# Patient Record
Sex: MALE | Race: WHITE | NOT HISPANIC OR LATINO | Employment: OTHER | URBAN - METROPOLITAN AREA
[De-identification: names, ages, dates, MRNs, and addresses within clinical notes are randomized per-mention and may not be internally consistent; named-entity substitution may affect disease eponyms.]

---

## 2022-05-26 ENCOUNTER — OFFICE VISIT (OUTPATIENT)
Dept: NEUROSURGERY | Facility: CLINIC | Age: 67
End: 2022-05-26

## 2022-05-26 VITALS
DIASTOLIC BLOOD PRESSURE: 86 MMHG | TEMPERATURE: 97.3 F | BODY MASS INDEX: 27.74 KG/M2 | SYSTOLIC BLOOD PRESSURE: 158 MMHG | WEIGHT: 183 LBS | HEIGHT: 68 IN

## 2022-05-26 DIAGNOSIS — S32.010A CLOSED COMPRESSION FRACTURE OF BODY OF L1 VERTEBRA: Primary | ICD-10-CM

## 2022-05-26 DIAGNOSIS — S32.020A CLOSED COMPRESSION FRACTURE OF L2 LUMBAR VERTEBRA, INITIAL ENCOUNTER: ICD-10-CM

## 2022-05-26 PROCEDURE — 99204 OFFICE O/P NEW MOD 45 MIN: CPT | Performed by: PHYSICIAN ASSISTANT

## 2022-05-26 RX ORDER — MELOXICAM 15 MG/1
15 TABLET ORAL DAILY
COMMUNITY
Start: 2022-03-21

## 2022-05-26 RX ORDER — ROSUVASTATIN CALCIUM 10 MG/1
10 TABLET, COATED ORAL DAILY
COMMUNITY
Start: 2022-03-30

## 2022-05-26 RX ORDER — LOSARTAN POTASSIUM 50 MG/1
TABLET ORAL
COMMUNITY
Start: 2022-03-28

## 2022-05-26 RX ORDER — TIZANIDINE 4 MG/1
TABLET ORAL
COMMUNITY
Start: 2022-05-03

## 2022-05-26 RX ORDER — OMEGA-3-ACID ETHYL ESTERS 1 G/1
CAPSULE, LIQUID FILLED ORAL
COMMUNITY
Start: 2022-03-28

## 2022-05-26 NOTE — PROGRESS NOTES
Subjective     Chief Complaint: Back pain    Patient ID: Jhonny Scott is a 66 y.o. male seen for consultation today at the request of  SILVERIO Scherer    History of Present Illness    This is a 66-year-old gentleman who presents today with a 3-month history of low back pain.  Symptoms began after jumping off of a dock into a boat 3 months ago.  He was then using a lawnmower a month later and felt worsening back pain.  Since that time he has had severe low midline back pain with associated paraspinous pain.  Pain is a 7/10.  He has been taking Tylenol and ibuprofen which minimally helps.  He denies any radiating pain into his lower extremities.  Denies numbness/tingling, weakness, saddle anesthesia or bowel or bladder dysfunction.    The following portions of the patient's history were reviewed and updated as appropriate: allergies, current medications, past family history, past medical history, past social history, past surgical history and problem list.    Family history:   Family History   Problem Relation Age of Onset   • Cancer Mother    • Dementia Mother    • Heart disease Father        Social history:   Social History     Socioeconomic History   • Marital status:    Tobacco Use   • Smoking status: Never Smoker   • Smokeless tobacco: Former User     Types: Chew     Quit date: 2010   Vaping Use   • Vaping Use: Never used   Substance and Sexual Activity   • Alcohol use: Yes     Comment: 2 drimks per day   • Drug use: Never   • Sexual activity: Defer       Review of Systems   Constitutional: Positive for activity change and fatigue. Negative for appetite change, chills, diaphoresis, fever and unexpected weight change.   HENT: Negative for congestion, dental problem, drooling, ear discharge, ear pain, facial swelling, hearing loss, mouth sores, nosebleeds, postnasal drip, rhinorrhea, sinus pressure, sinus pain, sneezing, sore throat, tinnitus, trouble swallowing and voice change.    Eyes: Negative for  "photophobia, pain, discharge, redness, itching and visual disturbance.   Respiratory: Negative for apnea, cough, choking, chest tightness, shortness of breath, wheezing and stridor.    Cardiovascular: Negative for chest pain, palpitations and leg swelling.   Gastrointestinal: Negative for abdominal distention, abdominal pain, anal bleeding, blood in stool, constipation, diarrhea, nausea, rectal pain and vomiting.   Endocrine: Negative for cold intolerance, heat intolerance, polydipsia, polyphagia and polyuria.   Genitourinary: Negative for decreased urine volume, difficulty urinating, dysuria, enuresis, flank pain, frequency, genital sores, hematuria, penile discharge, penile pain, penile swelling, scrotal swelling, testicular pain and urgency.   Musculoskeletal: Positive for back pain and gait problem. Negative for arthralgias, joint swelling, myalgias, neck pain and neck stiffness.   Skin: Negative for color change, pallor, rash and wound.   Allergic/Immunologic: Negative for environmental allergies, food allergies and immunocompromised state.   Neurological: Positive for weakness and numbness. Negative for dizziness, tremors, seizures, syncope, facial asymmetry, speech difficulty, light-headedness and headaches.   Hematological: Negative for adenopathy. Does not bruise/bleed easily.   Psychiatric/Behavioral: Negative for agitation, behavioral problems, confusion, decreased concentration, dysphoric mood, hallucinations, self-injury, sleep disturbance and suicidal ideas. The patient is not nervous/anxious and is not hyperactive.        Objective   Blood pressure 158/86, temperature 97.3 °F (36.3 °C), height 172.7 cm (68\"), weight 83 kg (183 lb).  Body mass index is 27.83 kg/m².  BMI is >= 25 and < 30. (Overweight) The following options were offered after discussion: weight loss educational material (shared in after visit summary)      Physical Exam  Constitutional:       Appearance: Normal appearance.   HENT:      " Head: Normocephalic and atraumatic.   Eyes:      Conjunctiva/sclera: Conjunctivae normal.   Pulmonary:      Effort: Pulmonary effort is normal.   Musculoskeletal:      Cervical back: Normal range of motion and neck supple.      Lumbar back: Tenderness and bony tenderness present. Negative right straight leg raise test and negative left straight leg raise test.   Skin:     General: Skin is warm and dry.   Neurological:      Mental Status: He is alert and oriented to person, place, and time.      GCS: GCS eye subscore is 4. GCS verbal subscore is 5. GCS motor subscore is 6.      Sensory: Sensation is intact.      Motor: Motor function is intact.      Coordination: Romberg sign negative.      Gait: Gait is intact.      Deep Tendon Reflexes: Reflexes normal.      Comments: Casual gait is normal.  He has full strength direct testing which is symmetric in both upper and lower extremities.  Sensation is intact to light touch testing and symmetric throughout.  Reflexes are 2+ and symmetric.  Clonus is absent.  Billie sign is absent.   Psychiatric:         Mood and Affect: Mood normal.         Behavior: Behavior normal.           Assessment & Plan       This is a 66-year-old gentleman who presents today with severe low back pain. MRI of the lumbar spine has been reviewed with evidence of acute to subacute compression fractures of L1 and L2.  The patient has no signs or symptoms of lumbosacral radiculopathy or cauda equina syndrome.  We will attempt to treat his symptoms conservatively with bracing and pain medication.  He will follow-up with standing x-rays in the next couple of weeks.  Signs/symptoms of warning urgent follow-up were carefully reviewed with him and he was instructed to call with new or worsening symptoms.    I have contacted [facility] to have the patient fit for a custom flexible TLSO.  The brace is to be worn when up and during activities.  Brace will provide pain control, comfort and spine  stabilization.    Diagnoses and all orders for this visit:    1. Closed compression fracture of body of L1 vertebra (HCC) (Primary)  -     Back Brace  -     XR Spine Lumbar Flex & Ext; Future    2. Closed compression fracture of L2 lumbar vertebra, initial encounter (HCC)  -     Back Brace  -     XR Spine Lumbar Flex & Ext; Future        Return in about 2 weeks (around 6/9/2022), or if symptoms worsen or fail to improve.             Tessa Troncoso PA-C

## 2022-06-07 ENCOUNTER — OFFICE VISIT (OUTPATIENT)
Dept: NEUROSURGERY | Facility: CLINIC | Age: 67
End: 2022-06-07

## 2022-06-07 VITALS — TEMPERATURE: 97.1 F | WEIGHT: 183 LBS | HEIGHT: 68 IN | BODY MASS INDEX: 27.74 KG/M2

## 2022-06-07 DIAGNOSIS — M80.08XA AGE-RELATED OSTEOPOROSIS WITH CURRENT PATHOLOGICAL FRACTURE, VERTEBRA(E), INITIAL ENCOUNTER FOR FRACTURE: ICD-10-CM

## 2022-06-07 DIAGNOSIS — S32.020A CLOSED COMPRESSION FRACTURE OF L2 LUMBAR VERTEBRA, INITIAL ENCOUNTER: ICD-10-CM

## 2022-06-07 DIAGNOSIS — S32.010A CLOSED COMPRESSION FRACTURE OF BODY OF L1 VERTEBRA: Primary | ICD-10-CM

## 2022-06-07 PROCEDURE — 99214 OFFICE O/P EST MOD 30 MIN: CPT | Performed by: STUDENT IN AN ORGANIZED HEALTH CARE EDUCATION/TRAINING PROGRAM

## 2022-06-07 NOTE — PROGRESS NOTES
Patient: Jhonny Scott  : 1955    Primary Care Provider: REYNALDO Acosta PA    Requesting Provider: As above      Chief Complaint: Follow-up (XR L1 & L2 compression fracture)      History of Present Illness: This is a 66 y.o. male who presents with 3 months of significant lower back pain.  The patient started having back pain when he jumped onto a boat a few months ago and then went to a masseuse who was manipulating his back and causes significant increase in the pain.  It is sharp and localized to his lower back.  He denies any pain radiating down his legs or weakness in the legs.  He underwent imaging which revealed acute compression fractures of L1 and L2.  He was initially seen by PA who placed him in a TLSO brace and he comes in today for follow-up.  The patient has found significant relief with the brace, but still has some pain when attempting to ambulate.    PMHX  Allergies:  No Known Allergies  Medications    Current Outpatient Medications:   •  losartan (COZAAR) 50 MG tablet, , Disp: , Rfl:   •  meloxicam (MOBIC) 15 MG tablet, Take 15 mg by mouth Daily., Disp: , Rfl:   •  omega-3 acid ethyl esters (LOVAZA) 1 g capsule, , Disp: , Rfl:   •  rosuvastatin (CRESTOR) 10 MG tablet, Take 10 mg by mouth Daily., Disp: , Rfl:   •  tiZANidine (ZANAFLEX) 4 MG tablet, TAKE ONE-HALF TABLET NIGHTLY FOR FOUR NIGHTS THEN INCREASE TO ONE TABLET ONCE NIGHTLY, Disp: , Rfl:   Past Medical History:  Past Medical History:   Diagnosis Date   • Arthritis    • Hypertension      Past Surgical History:  Past Surgical History:   Procedure Laterality Date   • LUMBAR DISCECTOMY      L4-5 - Dr. Josue Smith     Social Hx:  Social History     Tobacco Use   • Smoking status: Never Smoker   • Smokeless tobacco: Former User     Types: Chew     Quit date:    Vaping Use   • Vaping Use: Never used   Substance Use Topics   • Alcohol use: Yes     Comment: 2 drimks per day   • Drug use: Never     Family Hx:  Family History   Problem  Relation Age of Onset   • Cancer Mother    • Dementia Mother    • Heart disease Father      Review of Systems:        Review of Systems   Constitutional: Positive for activity change and fatigue. Negative for appetite change, chills, diaphoresis, fever and unexpected weight change.   HENT: Negative for congestion, dental problem, drooling, ear discharge, ear pain, facial swelling, hearing loss, mouth sores, nosebleeds, postnasal drip, rhinorrhea, sinus pressure, sinus pain, sneezing, sore throat, tinnitus, trouble swallowing and voice change.    Eyes: Negative for photophobia, pain, discharge, redness, itching and visual disturbance.   Respiratory: Negative for apnea, cough, choking, chest tightness, shortness of breath, wheezing and stridor.    Cardiovascular: Negative for chest pain, palpitations and leg swelling.   Gastrointestinal: Negative for abdominal distention, abdominal pain, anal bleeding, blood in stool, constipation, diarrhea, nausea, rectal pain and vomiting.   Endocrine: Negative for cold intolerance, heat intolerance, polydipsia, polyphagia and polyuria.   Genitourinary: Negative for decreased urine volume, difficulty urinating, dysuria, enuresis, flank pain, frequency, genital sores, hematuria, penile discharge, penile pain, penile swelling, scrotal swelling, testicular pain and urgency.   Musculoskeletal: Positive for back pain and gait problem. Negative for arthralgias, joint swelling, myalgias, neck pain and neck stiffness.   Skin: Negative for color change, pallor, rash and wound.   Allergic/Immunologic: Negative for environmental allergies, food allergies and immunocompromised state.   Neurological: Positive for weakness and numbness. Negative for dizziness, tremors, seizures, syncope, facial asymmetry, speech difficulty, light-headedness and headaches.   Hematological: Negative for adenopathy. Does not bruise/bleed easily.   Psychiatric/Behavioral: Negative for agitation, behavioral problems,  "confusion, decreased concentration, dysphoric mood, hallucinations, self-injury, sleep disturbance and suicidal ideas. The patient is not nervous/anxious and is not hyperactive.         Physical Exam:   Temp 97.1 °F (36.2 °C) (Infrared)   Ht 172.7 cm (68\")   Wt 83 kg (183 lb)   BMI 27.83 kg/m²   Awake, alert and oriented x 3  Speech f/c  Opens eyes spont  Pupils 3 mm rx bilaterally  Extraocular muscles intact bilaterally  Normal sensation to light touch in all 3 distributions of CN V bilaterally  Face symmetric bilaterally  Tongue midline  5/5 in his LE's bilaterally     Diagnostic Studies:  All neurological imaging studies were independently reviewed unless stated otherwise    Assessment/Plan:  This is a 66 y.o. male presenting with acute compression fractures of L1 and L2.  In reviewing the patient's imaging he has worsening of the compression fractures on his recent x-rays.  The patient has found some relief with the TLSO brace, but continues to have pain especially when trying to ambulate.  Due to his worsening fracture and inability to ambulate, I recommended he undergo L1 and L2 kyphoplasties.  I explained the risks, benefits and alternatives the patient.  He understood these and like to proceed with the procedure.  We will Schedule in the near future.    Diagnoses and all orders for this visit:    1. Closed compression fracture of body of L1 vertebra (HCC) (Primary)    2. Closed compression fracture of L2 lumbar vertebra, initial encounter ([facility])    Other orders  -     XR outside films; Future        Raghav Callahan MD  06/07/22  16:28 EDT  "

## 2022-06-07 NOTE — PROGRESS NOTES
"NEUROSURGERY PROGRESS NOTE    Chief Complaint: ***    Subjective: ***    Objective    Vital Signs: Temperature 97.1 °F (36.2 °C), temperature source Infrared, height 172.7 cm (68\"), weight 83 kg (183 lb).    Physical Exam  Awake, alert and oriented x 3  Opens eyes spont  Pupils 3 mm rx bilat  Extraocular muscles intact bilaterally  Face symmetric bilaterally  Tongue midline  5/5 in all 4 ext  No pronator drift    Current Medications:   Current Outpatient Medications:   •  losartan (COZAAR) 50 MG tablet, , Disp: , Rfl:   •  meloxicam (MOBIC) 15 MG tablet, Take 15 mg by mouth Daily., Disp: , Rfl:   •  omega-3 acid ethyl esters (LOVAZA) 1 g capsule, , Disp: , Rfl:   •  rosuvastatin (CRESTOR) 10 MG tablet, Take 10 mg by mouth Daily., Disp: , Rfl:   •  tiZANidine (ZANAFLEX) 4 MG tablet, TAKE ONE-HALF TABLET NIGHTLY FOR FOUR NIGHTS THEN INCREASE TO ONE TABLET ONCE NIGHTLY, Disp: , Rfl:      Laboratory Results:                              Brief Urine Lab Results     None        Microbiology Results (last 10 days)     ** No results found for the last 240 hours. **          Diagnostic Imaging: I reviewed and independently interpreted the new imaging. ***    Assessment/Plan:  ***    There are no diagnoses linked to this encounter.    Raghav Clalahan MD  06/07/22  15:54 EDT    "

## 2022-06-08 PROBLEM — S32.010A CLOSED COMPRESSION FRACTURE OF BODY OF L1 VERTEBRA: Status: ACTIVE | Noted: 2022-06-08

## 2022-06-08 PROBLEM — M80.08XA AGE-RELATED OSTEOPOROSIS WITH CURRENT PATHOLOGICAL FRACTURE, VERTEBRA(E), INITIAL ENCOUNTER FOR FRACTURE: Status: ACTIVE | Noted: 2022-06-08

## 2022-07-26 ENCOUNTER — TELEPHONE (OUTPATIENT)
Dept: NEUROSURGERY | Facility: CLINIC | Age: 67
End: 2022-07-26

## 2022-07-26 DIAGNOSIS — S32.010A CLOSED COMPRESSION FRACTURE OF BODY OF L1 VERTEBRA: Primary | ICD-10-CM

## 2022-07-26 DIAGNOSIS — S32.020A CLOSED COMPRESSION FRACTURE OF L2 LUMBAR VERTEBRA, INITIAL ENCOUNTER: ICD-10-CM

## 2022-07-26 NOTE — TELEPHONE ENCOUNTER
----- Message from Katharine Alfred MA sent at 7/26/2022 11:28 AM EDT -----  Please contact pt and schedule MRI for kyphoplasty planning. NK

## 2022-07-26 NOTE — TELEPHONE ENCOUNTER
Called and spoke w/ Pt and informed him about his MRI appt on 7/28/22 @ 4pm at [facility] [place] and f/u w/ Dr. Callahan on 8/1/22 @10:30. Pt appeciated it. Thanks- EC

## 2022-07-28 ENCOUNTER — HOSPITAL ENCOUNTER (OUTPATIENT)
Dept: MRI IMAGING | Facility: HOSPITAL | Age: 67
Discharge: HOME | End: 2022-07-28
Admitting: STUDENT IN AN ORGANIZED HEALTH CARE EDUCATION/TRAINING PROGRAM

## 2022-07-28 DIAGNOSIS — S32.020A CLOSED COMPRESSION FRACTURE OF L2 LUMBAR VERTEBRA, INITIAL ENCOUNTER: ICD-10-CM

## 2022-07-28 DIAGNOSIS — S32.010A CLOSED COMPRESSION FRACTURE OF BODY OF L1 VERTEBRA: ICD-10-CM

## 2022-07-28 PROCEDURE — 72148 MRI LUMBAR SPINE W/O DYE: CPT

## 2022-08-01 ENCOUNTER — OFFICE VISIT (OUTPATIENT)
Dept: NEUROSURGERY | Facility: CLINIC | Age: 67
End: 2022-08-01

## 2022-08-01 VITALS
TEMPERATURE: 97.1 F | BODY MASS INDEX: 27.1 KG/M2 | SYSTOLIC BLOOD PRESSURE: 142 MMHG | HEIGHT: 68 IN | DIASTOLIC BLOOD PRESSURE: 64 MMHG | WEIGHT: 178.8 LBS

## 2022-08-01 DIAGNOSIS — S32.020A CLOSED COMPRESSION FRACTURE OF L2 LUMBAR VERTEBRA, INITIAL ENCOUNTER: ICD-10-CM

## 2022-08-01 DIAGNOSIS — S32.010A CLOSED COMPRESSION FRACTURE OF BODY OF L1 VERTEBRA: Primary | ICD-10-CM

## 2022-08-01 PROCEDURE — 99213 OFFICE O/P EST LOW 20 MIN: CPT | Performed by: STUDENT IN AN ORGANIZED HEALTH CARE EDUCATION/TRAINING PROGRAM

## 2022-08-01 NOTE — PROGRESS NOTES
"NEUROSURGERY PROGRESS NOTE    Chief Complaint: L1 and L2 compression fractures    Subjective: This is a 66-year-old male who I previously evaluated for L1 and L2 compression fractures.  He had a fall in April and has been having back pain since.  Initially, I saw him in June and scheduled him for kyphoplasty, but his insurance company denied it.  He comes in today with follow-up with a new MRI.  He states that his back pain is continuing to improve.  As fine some relief with medications and bracing and has been able to ambulate.    Objective    Vital Signs: Blood pressure 142/64, temperature 97.1 °F (36.2 °C), temperature source Infrared, height 172.7 cm (68\"), weight 81.1 kg (178 lb 12.8 oz).    Physical Exam  Awake, alert and oriented x 3  Opens eyes spont  Pupils 3 mm rx bilat  Extraocular muscles intact bilaterally  Face symmetric bilaterally  Tongue midline  5/5 in his LE's bilaterally     Current Medications:   Current Outpatient Medications:   •  losartan (COZAAR) 50 MG tablet, , Disp: , Rfl:   •  meloxicam (MOBIC) 15 MG tablet, Take 15 mg by mouth Daily., Disp: , Rfl:   •  omega-3 acid ethyl esters (LOVAZA) 1 g capsule, , Disp: , Rfl:   •  rosuvastatin (CRESTOR) 10 MG tablet, Take 10 mg by mouth Daily., Disp: , Rfl:   •  tiZANidine (ZANAFLEX) 4 MG tablet, TAKE ONE-HALF TABLET NIGHTLY FOR FOUR NIGHTS THEN INCREASE TO ONE TABLET ONCE NIGHTLY, Disp: , Rfl:      Laboratory Results:                              Brief Urine Lab Results     None        Microbiology Results (last 10 days)     ** No results found for the last 240 hours. **          Diagnostic Imaging: I reviewed and independently interpreted the new imaging.     Assessment/Plan:  This is a 66-year-old male with L1 and L2 compression fractures from a fall in April.  The patient comes in today with a new MRI.  Clinically, the patient is endorsing improvement in his back pain.  His MRI shows no STIR signal change to suggest ongoing edema from the " fracture.  I explained that given these findings, I do not think he would experience much benefit from a kyphoplasty.  He understood and agreed that we would not proceed with the procedure.  At this time, we will not schedule any further follow-up with me, but I did tell the patient and his family to call if develops new or worsening issues.    Diagnoses and all orders for this visit:    1. Closed compression fracture of body of L1 vertebra ([facility]) (Primary)    2. Closed compression fracture of L2 lumbar vertebra, initial encounter ([facility])        Raghav Callahan MD  08/01/22  10:54 EDT